# Patient Record
Sex: FEMALE | Race: WHITE | URBAN - METROPOLITAN AREA
[De-identification: names, ages, dates, MRNs, and addresses within clinical notes are randomized per-mention and may not be internally consistent; named-entity substitution may affect disease eponyms.]

---

## 2017-05-08 ENCOUNTER — OFFICE VISIT (OUTPATIENT)
Dept: FAMILY MEDICINE | Facility: CLINIC | Age: 27
End: 2017-05-08
Payer: COMMERCIAL

## 2017-05-08 VITALS
RESPIRATION RATE: 16 BRPM | DIASTOLIC BLOOD PRESSURE: 62 MMHG | BODY MASS INDEX: 20.33 KG/M2 | HEART RATE: 84 BPM | SYSTOLIC BLOOD PRESSURE: 108 MMHG | TEMPERATURE: 98.6 F | OXYGEN SATURATION: 100 % | WEIGHT: 122 LBS | HEIGHT: 65 IN

## 2017-05-08 DIAGNOSIS — M25.512 ACUTE PAIN OF LEFT SHOULDER: Primary | ICD-10-CM

## 2017-05-08 PROCEDURE — 99213 OFFICE O/P EST LOW 20 MIN: CPT | Performed by: NURSE PRACTITIONER
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
## 2017-05-08 NOTE — MR AVS SNAPSHOT
After Visit Summary   5/8/2017    Kady Lambert    MRN: [id number]           Patient Information     Date Of Birth          1990        Visit Information        Provider Department      5/8/2017 1:00 PM Corrina Jordan APRN CNP [facility]        Today's Diagnoses     Acute pain of left shoulder    -  1       Follow-ups after your visit        Additional Services     ORTHOPEDICS ADULT REFERRAL       Your provider has referred you to: [facility]: [facility] [place] -  [facility]  [phone]   [email or web address]  [email or web address]: [facility] [place] [phone]   [email or web address]    [facility] [place] [phone]   [email or web address] [phone]   [email or web address]    Please be aware that coverage of these services is subject to the terms and limitations of your health insurance plan.  Call member services at your health plan with any benefit or coverage questions.      Please bring the following to your appointment:    >>   Any x-rays, CTs or MRIs which have been performed.  Contact the facility where they were done to arrange for  prior to your scheduled appointment.    >>   List of current medications   >>   This referral request   >>   Any documents/labs given to you for this referral                  Who to contact     If you have questions or need follow up information about today's clinic visit or your schedule please contact [facility] directly at [phone].  Normal or non-critical lab and imaging results will be communicated to you by MyChart, letter or phone within 4 business days after the clinic has received the results. If you do not hear from us within 7 days, please contact the clinic through [employer]  "or phone. If you have a critical or abnormal lab result, we will notify you by phone as soon as possible.  Submit refill requests through [employer] or call your pharmacy and they will forward the refill request to us. Please allow 3 business days for your refill to be completed.          Additional Information About Your Visit        Rock-It Cargohart Information     [employer] gives you secure access to your electronic health record. If you see a primary care provider, you can also send messages to your care team and make appointments. If you have questions, please call your primary care clinic.  If you do not have a primary care provider, please call [phone] and they will assist you.        Care EveryWhere ID     This is your Care EveryWhere ID. This could be used by other organizations to access your [facility] medical records  [id number]        Your Vitals Were     Pulse Temperature Respirations Height Pulse Oximetry BMI (Body Mass Index)    84 98.6  F (37  C) (Oral) 16 5' 4.5\" (1.638 m) 100% 20.62 kg/m2       Blood Pressure from Last 3 Encounters:   05/08/17 108/62   04/01/15 90/64   03/28/15 98/60    Weight from Last 3 Encounters:   05/08/17 122 lb (55.3 kg)   04/01/15 130 lb (59 kg)   03/28/15 124 lb (56.2 kg)              We Performed the Following     ORTHOPEDICS ADULT REFERRAL        Primary Care Provider Office Phone # Fax #    AV Vera [facility] [phone]       [facility] [address] [place] [address]        Thank you!     Thank you for choosing [facility]  for your care. Our goal is always to provide you with excellent care. Hearing back from our patients is one way we can continue to improve our services. Please take a few minutes to complete the written survey that you may receive in the mail after your visit with us. Thank you!             Your Updated Medication List - Protect others around you: Learn how to safely use, store and " throw away your medicines at www.disposemymeds.org.          This list is accurate as of: 5/8/17  1:27 PM.  Always use your most recent med list.                   Brand Name Dispense Instructions for use    IBUPROFEN      as needed.       * methylphenidate 20 MG tablet    RITALIN    60 tablet    Take 1 tablet (20 mg) by mouth 2 times daily       * methylphenidate 20 MG tablet    RITALIN    60 tablet    Take 1 tablet (20 mg) by mouth 2 times daily       * methylphenidate 20 MG tablet    RITALIN    60 tablet    Take 1 tablet (20 mg) by mouth 2 times daily       * methylphenidate 20 MG tablet    RITALIN    60 tablet    Take 1 tablet (20 mg) by mouth 2 times daily       * Notice:  This list has 4 medication(s) that are the same as other medications prescribed for you. Read the directions carefully, and ask your doctor or other care provider to review them with you.

## 2017-05-08 NOTE — NURSING NOTE
"Chief Complaint   Patient presents with     Musculoskeletal Problem       Initial /62 (BP Location: Left arm, Patient Position: Chair, Cuff Size: Adult Regular)  Pulse 84  Temp 98.6  F (37  C) (Oral)  Resp 16  Ht 5' 4.5\" (1.638 m)  Wt 122 lb (55.3 kg)  SpO2 100%  BMI 20.62 kg/m2 Estimated body mass index is 20.62 kg/(m^2) as calculated from the following:    Height as of this encounter: 5' 4.5\" (1.638 m).    Weight as of this encounter: 122 lb (55.3 kg).  Medication Reconciliation: complete     Nisa Hayes CMA      "

## 2017-07-01 ENCOUNTER — HEALTH MAINTENANCE LETTER (OUTPATIENT)
Age: 27
End: 2017-07-01

## 2018-05-02 ENCOUNTER — OFFICE VISIT (OUTPATIENT)
Dept: FAMILY MEDICINE | Facility: CLINIC | Age: 28
End: 2018-05-02
Payer: COMMERCIAL

## 2018-05-02 VITALS
SYSTOLIC BLOOD PRESSURE: 134 MMHG | OXYGEN SATURATION: 98 % | BODY MASS INDEX: 20.99 KG/M2 | TEMPERATURE: 98.1 F | DIASTOLIC BLOOD PRESSURE: 76 MMHG | HEIGHT: 65 IN | RESPIRATION RATE: 16 BRPM | HEART RATE: 66 BPM | WEIGHT: 126 LBS

## 2018-05-02 DIAGNOSIS — Z11.3 SCREEN FOR STD (SEXUALLY TRANSMITTED DISEASE): ICD-10-CM

## 2018-05-02 DIAGNOSIS — Z00.00 ROUTINE GENERAL MEDICAL EXAMINATION AT A HEALTH CARE FACILITY: Primary | ICD-10-CM

## 2018-05-02 DIAGNOSIS — Z12.4 SCREENING FOR MALIGNANT NEOPLASM OF CERVIX: ICD-10-CM

## 2018-05-02 PROCEDURE — 36415 COLL VENOUS BLD VENIPUNCTURE: CPT | Performed by: NURSE PRACTITIONER

## 2018-05-02 PROCEDURE — G0145 SCR C/V CYTO,THINLAYER,RESCR: HCPCS | Performed by: NURSE PRACTITIONER

## 2018-05-02 PROCEDURE — 86780 TREPONEMA PALLIDUM: CPT | Performed by: NURSE PRACTITIONER

## 2018-05-02 PROCEDURE — 87491 CHLMYD TRACH DNA AMP PROBE: CPT | Performed by: NURSE PRACTITIONER

## 2018-05-02 PROCEDURE — 86803 HEPATITIS C AB TEST: CPT | Performed by: NURSE PRACTITIONER

## 2018-05-02 PROCEDURE — 99395 PREV VISIT EST AGE 18-39: CPT | Performed by: NURSE PRACTITIONER

## 2018-05-02 PROCEDURE — 87591 N.GONORRHOEAE DNA AMP PROB: CPT | Performed by: NURSE PRACTITIONER

## 2018-05-02 PROCEDURE — 87389 HIV-1 AG W/HIV-1&-2 AB AG IA: CPT | Performed by: NURSE PRACTITIONER

## 2018-05-02 NOTE — NURSING NOTE
"Chief Complaint   Patient presents with     Physical       Initial /76  Pulse 66  Temp 98.1  F (36.7  C) (Oral)  Resp 16  Ht 5' 4.5\" (1.638 m)  Wt 126 lb (57.2 kg)  SpO2 98%  Breastfeeding? No  BMI 21.29 kg/m2 Estimated body mass index is 21.29 kg/(m^2) as calculated from the following:    Height as of this encounter: 5' 4.5\" (1.638 m).    Weight as of this encounter: 126 lb (57.2 kg).  Medication Reconciliation: complete       Lb Riley MA       "

## 2018-05-02 NOTE — PROGRESS NOTES
SUBJECTIVE:   CC: Kady Lambert is an 27 year old woman who presents for preventive health visit.     Physical   Annual:     Getting at least 3 servings of Calcium per day::  Yes    Bi-annual eye exam::  Yes    Dental care twice a year::  Yes    Sleep apnea or symptoms of sleep apnea::  None    Diet::  Regular (no restrictions)    Frequency of exercise::  4-5 days/week    Duration of exercise::  Greater than 60 minutes    Taking medications regularly::  Yes    Medication side effects::  None    Additional concerns today::  No                Today's PHQ-2 Score:   PHQ-2 ( 1999 Pfizer) 5/2/2018   Q1: Little interest or pleasure in doing things 0   Q2: Feeling down, depressed or hopeless 0   PHQ-2 Score 0   Q1: Little interest or pleasure in doing things Not at all   Q2: Feeling down, depressed or hopeless Not at all   PHQ-2 Score 0       Abuse: Current or Past(Physical, Sexual or Emotional)- No  Do you feel safe in your environment - Yes    Social History   Substance Use Topics     Smoking status: Never Smoker     Smokeless tobacco: Never Used     Alcohol use Yes      Comment: occusionally     Alcohol Use 5/2/2018   If you drink alcohol do you typically have greater than 3 drinks per day OR greater than 7 drinks per week? Yes   AUDIT SCORE  6     AUDIT - Alcohol Use Disorders Identification Test - Reproduced from the World Health Organization Audit 2001 (Second Edition) 5/2/2018   1.  How often do you have a drink containing alcohol? 2 to 3 times a week   2.  How many drinks containing alcohol do you have on a typical day when you are drinking? 3 or 4   3.  How often do you have five or more drinks on one occasion? Monthly   4.  How often during the last year have you found that you were not able to stop drinking once you had started? Never   5.  How often during the last year have you failed to do what was normally expected of you because of drinking? Never   6.  How often during the last year have you needed  a first drink in the morning to get yourself going after a heavy drinking session? Never   7.  How often during the last year have you had a feeling of guilt or remorse after drinking? Never   8.  How often during the last year have you been unable to remember what happened the night before because of your drinking? Never   9.  Have you or someone else been injured because of your drinking? No   10. Has a relative, friend, doctor or other health care worker been concerned about your drinking or suggested you cut down? No   TOTAL SCORE 6       Reviewed orders with patient.  Reviewed health maintenance and updated orders accordingly - Yes  Labs reviewed in EPIC  BP Readings from Last 3 Encounters:   05/02/18 134/76   05/08/17 108/62   04/01/15 90/64    Wt Readings from Last 3 Encounters:   05/02/18 126 lb (57.2 kg)   05/08/17 122 lb (55.3 kg)   04/01/15 130 lb (59 kg)                  Patient Active Problem List   Diagnosis     Dysmenorrhea     CARDIOVASCULAR SCREENING; LDL GOAL LESS THAN 160     Wart     Nevus     ADD (attention deficit disorder)     High risk HPV infection, HPV 58, age 19     Past Surgical History:   Procedure Laterality Date     HC REMOVAL OF TONSILS,12+ Y/O  2001    Tonsils 11+y.o.       Social History   Substance Use Topics     Smoking status: Never Smoker     Smokeless tobacco: Never Used     Alcohol use Yes      Comment: occusionally     Family History   Problem Relation Age of Onset     Blood Disease Father      Factor V Leiden     Cardiovascular Paternal Grandfather      Blood Disease Paternal Grandfather      Factor V Leiden     Depression Maternal Grandmother      Depression Maternal Grandfather          Current Outpatient Prescriptions   Medication Sig Dispense Refill     IBUPROFEN as needed.       Allergies   Allergen Reactions     No Known Allergies      Recent Labs   Lab Test  03/28/15   0926  03/18/11   1123   CR   --   0.80   GFRESTIMATED   --   >90   GFRESTBLACK   --   >90  "  POTASSIUM   --   4.2   TSH  1.76   --         Mammogram not appropriate for this patient based on age.    Pertinent mammograms are reviewed under the imaging tab.  History of abnormal Pap smear:   Last 3 Pap and HPV Results:   PAP / HPV 9/23/2013 6/12/2012 4/16/2010   PAP NIL NIL ASC-US(A)       Reviewed and updated as needed this visit by clinical staff  Tobacco  Allergies  Meds  Med Hx  Surg Hx  Fam Hx  Soc Hx        Reviewed and updated as needed this visit by Provider  Tobacco            Review of Systems  CONSTITUTIONAL: NEGATIVE for fever, chills, change in weight  INTEGUMENTARU/SKIN: NEGATIVE for worrisome rashes, moles or lesions  EYES: NEGATIVE for vision changes or irritation  ENT: NEGATIVE for ear, mouth and throat problems  RESP: NEGATIVE for significant cough or SOB  BREAST: NEGATIVE for masses, tenderness or discharge  CV: NEGATIVE for chest pain, palpitations or peripheral edema  GI: NEGATIVE for nausea, abdominal pain, heartburn, or change in bowel habits  : NEGATIVE for unusual urinary or vaginal symptoms. Periods are irregular.  MUSCULOSKELETAL: NEGATIVE for significant arthralgias or myalgia  NEURO: NEGATIVE for weakness, dizziness or paresthesias  PSYCHIATRIC: NEGATIVE for changes in mood or affect     OBJECTIVE:   /76  Pulse 66  Temp 98.1  F (36.7  C) (Oral)  Resp 16  Ht 5' 4.5\" (1.638 m)  Wt 126 lb (57.2 kg)  LMP 04/02/2018 (Approximate)  SpO2 98%  Breastfeeding? No  BMI 21.29 kg/m2  Physical Exam  GENERAL: healthy, alert and no distress  EYES: Eyes grossly normal to inspection, PERRL and conjunctivae and sclerae normal  HENT: ear canals and TM's normal, nose and mouth without ulcers or lesions  NECK: no adenopathy, no asymmetry, masses, or scars and thyroid normal to palpation  RESP: lungs clear to auscultation - no rales, rhonchi or wheezes  BREAST: normal without masses, tenderness or nipple discharge and no palpable axillary masses or adenopathy  CV: regular rate " "and rhythm, normal S1 S2, no S3 or S4, no murmur, click or rub, no peripheral edema and peripheral pulses strong  ABDOMEN: soft, nontender, no hepatosplenomegaly, no masses and bowel sounds normal   (female): normal female external genitalia, normal urethral meatus, vaginal mucosa pink, moist, well rugated, and normal cervix/adnexa/uterus without masses or discharge  MS: no gross musculoskeletal defects noted, no edema  SKIN: no suspicious lesions or rashes  NEURO: Normal strength and tone, mentation intact and speech normal  PSYCH: mentation appears normal, affect normal/bright    ASSESSMENT/PLAN:     (Z00.00) Routine general medical examination at a health care facility  (primary encounter diagnosis)  Comment:   Plan:     (Z12.4) Screening for malignant neoplasm of cervix  Comment:   Plan: Pap imaged thin layer screen reflex to HPV if         ASCUS - recommend age 25 - 29            (Z11.3) Screen for STD (sexually transmitted disease)  Comment:   Plan: Chlamydia trachomatis PCR, Neisseria         gonorrhoeae PCR, Hepatitis C antibody, HIV         Antigen Antibody Combo, Treponema Abs w Reflex         to RPR and Titer            COUNSELING:  Reviewed preventive health counseling, as reflected in patient instructions         reports that she has never smoked. She has never used smokeless tobacco.    Estimated body mass index is 21.29 kg/(m^2) as calculated from the following:    Height as of this encounter: 5' 4.5\" (1.638 m).    Weight as of this encounter: 126 lb (57.2 kg).       Counseling Resources:  ATP IV Guidelines  Pooled Cohorts Equation Calculator  Breast Cancer Risk Calculator  FRAX Risk Assessment  ICSI Preventive Guidelines  Dietary Guidelines for Americans, 2010  USDA's MyPlate  ASA Prophylaxis  Lung CA Screening    AV [facility]  Answers for HPI/ROS submitted by the patient on 5/2/2018   PHQ-2 Score: 0    "

## 2018-05-02 NOTE — MR AVS SNAPSHOT
After Visit Summary   5/2/2018    Kady Lambert    MRN: [id number]           Patient Information     Date Of Birth          1990        Visit Information        Provider Department      5/2/2018 2:40 PM Corrina Jordan APRN [facility]        Today's Diagnoses     Routine general medical examination at a health care facility    -  1    Screening for malignant neoplasm of cervix        Screen for STD (sexually transmitted disease)          Care Instructions      Preventive Health Recommendations  Female Ages 26 - 39  Yearly exam:   See your health care provider every year in order to    Review health changes.     Discuss preventive care.      Review your medicines if you your doctor has prescribed any.    Until age 30: Get a Pap test every three years (more often if you have had an abnormal result).    After age 30: Talk to your doctor about whether you should have a Pap test every 3 years or have a Pap test with HPV screening every 5 years.   You do not need a Pap test if your uterus was removed (hysterectomy) and you have not had cancer.  You should be tested each year for STDs (sexually transmitted diseases), if you're at risk.   Talk to your provider about how often to have your cholesterol checked.  If you are at risk for diabetes, you should have a diabetes test (fasting glucose).  Shots: Get a flu shot each year. Get a tetanus shot every 10 years.   Nutrition:     Eat at least 5 servings of fruits and vegetables each day.    Eat whole-grain bread, whole-wheat pasta and brown rice instead of white grains and rice.    Talk to your provider about Calcium and Vitamin D.     Lifestyle    Exercise at least 150 minutes a week (30 minutes a day, 5 days of the week). This will help you control your weight and prevent disease.    Limit alcohol to one drink per day.    No smoking.     Wear sunscreen to prevent skin cancer.    See your dentist every six months for  "an exam and cleaning.            Follow-ups after your visit        Who to contact     If you have questions or need follow up information about today's clinic visit or your schedule please contact [facility] directly at [phone].  Normal or non-critical lab and imaging results will be communicated to you by NavTechhart, letter or phone within 4 business days after the clinic has received the results. If you do not hear from us within 7 days, please contact the clinic through NavTechhart or phone. If you have a critical or abnormal lab result, we will notify you by phone as soon as possible.  Submit refill requests through [employer] or call your pharmacy and they will forward the refill request to us. Please allow 3 business days for your refill to be completed.          Additional Information About Your Visit        NavTechhart Information     [employer] gives you secure access to your electronic health record. If you see a primary care provider, you can also send messages to your care team and make appointments. If you have questions, please call your primary care clinic.  If you do not have a primary care provider, please call [phone] and they will assist you.        Care EveryWhere ID     This is your Care EveryWhere ID. This could be used by other organizations to access your [facility] medical records  [id number]        Your Vitals Were     Pulse Temperature Respirations Height Last Period Pulse Oximetry    66 98.1  F (36.7  C) (Oral) 16 5' 4.5\" (1.638 m) 04/02/2018 (Approximate) 98%    Breastfeeding? BMI (Body Mass Index)                No 21.29 kg/m2           Blood Pressure from Last 3 Encounters:   05/02/18 134/76   05/08/17 108/62   04/01/15 90/64    Weight from Last 3 Encounters:   05/02/18 126 lb (57.2 kg)   05/08/17 122 lb (55.3 kg)   04/01/15 130 lb (59 kg)              We Performed the Following     Chlamydia trachomatis PCR     Hepatitis C antibody     HIV Antigen Antibody Combo     " Neisseria gonorrhoeae PCR     Pap imaged thin layer screen reflex to HPV if ASCUS - recommend age 25 - 29     Treponema Abs w Reflex to RPR and Titer        Primary Care Provider Office Phone # Fax #    AV Vera [place] [phone] [address] [place] [address]        Equal Access to Services     STEFFANIE TAVERA : Hadii aad ku hadasho Soomaali, waaxda luqadaha, qaybta kaalmada adeegyada, waxay idiin hayaan adeeg kharash la'aan ah. So [facility] [phone].    ATENCIÓN: Si habla español, tiene a siddiqui disposición servicios gratuitos de asistencia lingüística. Gurinder al [phone].    We comply with applicable federal civil rights laws and [place] laws. We do not discriminate on the basis of race, color, national origin, age, disability, sex, sexual orientation, or gender identity.            Thank you!     Thank you for choosing [facility]  for your care. Our goal is always to provide you with excellent care. Hearing back from our patients is one way we can continue to improve our services. Please take a few minutes to complete the written survey that you may receive in the mail after your visit with us. Thank you!             Your Updated Medication List - Protect others around you: Learn how to safely use, store and throw away your medicines at www.disposemymeds.org.          This list is accurate as of 5/2/18  3:20 PM.  Always use your most recent med list.                   Brand Name Dispense Instructions for use Diagnosis    IBUPROFEN      as needed.

## 2018-05-03 LAB
C TRACH DNA SPEC QL NAA+PROBE: NEGATIVE
HCV AB SERPL QL IA: NONREACTIVE
HIV 1+2 AB+HIV1 P24 AG SERPL QL IA: NONREACTIVE
N GONORRHOEA DNA SPEC QL NAA+PROBE: NEGATIVE
SPECIMEN SOURCE: NORMAL
SPECIMEN SOURCE: NORMAL
T PALLIDUM AB SER QL: NONREACTIVE

## 2018-05-03 NOTE — PROGRESS NOTES
Tatiana Hillman,    This note is to let you know that your comprehensive STD (sexually transmitted diseases) panel is negative.   There is no sign of HIV, hepatitis, syphilis, gonorrhea, or chlamydia.     Let me know if you have any questions!    Corrina LEY CNP

## 2018-05-06 LAB
COPATH REPORT: NORMAL
PAP: NORMAL

## 2018-09-10 ENCOUNTER — OFFICE VISIT (OUTPATIENT)
Dept: URGENT CARE | Facility: URGENT CARE | Age: 28
End: 2018-09-10
Payer: COMMERCIAL

## 2018-09-10 ENCOUNTER — RADIANT APPOINTMENT (OUTPATIENT)
Dept: GENERAL RADIOLOGY | Facility: CLINIC | Age: 28
End: 2018-09-10
Attending: PHYSICIAN ASSISTANT
Payer: COMMERCIAL

## 2018-09-10 VITALS
HEART RATE: 80 BPM | WEIGHT: 125 LBS | SYSTOLIC BLOOD PRESSURE: 118 MMHG | HEIGHT: 65 IN | BODY MASS INDEX: 20.83 KG/M2 | TEMPERATURE: 98.3 F | DIASTOLIC BLOOD PRESSURE: 80 MMHG

## 2018-09-10 DIAGNOSIS — S92.341A CLOSED DISPLACED FRACTURE OF FOURTH METATARSAL BONE OF RIGHT FOOT, INITIAL ENCOUNTER: Primary | ICD-10-CM

## 2018-09-10 DIAGNOSIS — S99.921A FOOT INJURY, RIGHT, INITIAL ENCOUNTER: ICD-10-CM

## 2018-09-10 DIAGNOSIS — S92.331A CLOSED DISPLACED FRACTURE OF THIRD METATARSAL BONE OF RIGHT FOOT, INITIAL ENCOUNTER: ICD-10-CM

## 2018-09-10 PROCEDURE — 99213 OFFICE O/P EST LOW 20 MIN: CPT | Performed by: PHYSICIAN ASSISTANT

## 2018-09-10 PROCEDURE — 73630 X-RAY EXAM OF FOOT: CPT | Mod: RT

## 2018-09-10 NOTE — PATIENT INSTRUCTIONS
Your xray shows broken 3rd and 4th metatarsals.  We have placed you in a boot and crutches. You are not to bear any weight on the foot.  May remove the boot for sleeping only.  Use ice and elevation to help decrease pain and swelling.  Take ibuprofen and Tylenol as needed for pain.  Follow up with podiatry in 1 week- referral placed and you will receive a phone call to help you schedule your appointment.    Return to the ER immediately if severe lower leg pain, lack of color or pulses in the foot, or any other severe symptoms.

## 2018-09-10 NOTE — MR AVS SNAPSHOT
After Visit Summary   9/10/2018    Kady Lambert    MRN: [id number]           Patient Information     Date Of Birth          1990        Visit Information        Provider Department      9/10/2018 5:25 PM Baylee Simmons PA-C [facility]        Today's Diagnoses     Closed displaced fracture of fourth metatarsal bone of right foot, initial encounter    -  1    Foot injury, right, initial encounter          Care Instructions    Your xray shows a broken 4th metatarsal.   We have placed you in a boot and crutches. You are not to bear any weight on the foot.  May remove the boot for sleeping only.  Use ice and elevation to help decrease pain and swelling.  Take ibuprofen and Tylenol as needed for pain.  Follow up with podiatry in 1 week- referral placed and you will receive a phone call to help you schedule your appointment.    Return to the ER immediately if severe lower leg pain, lack of color or pulses in the foot, or any other severe symptoms.          Follow-ups after your visit        Additional Services     PODIATRY/FOOT & ANKLE SURGERY REFERRAL       Your provider has referred you to: FMG: [facility] [phone]   [email or web address]  FMG: [facility] [phone]   [email or web address]  FMG: [facility] [phone]   [email or web address]/    Please be aware that coverage of these services is subject to the terms and limitations of your health insurance plan.  Call member services at your health plan with any benefit or coverage questions.      Please bring the following to your appointment:  >>   Any x-rays, CTs or MRIs which have been performed.  Contact the facility where they were done to arrange for  prior to your scheduled appointment.    >>   List of current medications   >>    "This referral request   >>   Any documents/labs given to you for this referral                  Follow-up notes from your care team     Return in about 1 week (around 9/17/2018) for podiatry.      Who to contact     If you have questions or need follow up information about today's clinic visit or your schedule please contact [facility] directly at [phone].  Normal or non-critical lab and imaging results will be communicated to you by SmartGrainshart, letter or phone within 4 business days after the clinic has received the results. If you do not hear from us within 7 days, please contact the clinic through Simplert or phone. If you have a critical or abnormal lab result, we will notify you by phone as soon as possible.  Submit refill requests through Flywheel or call your pharmacy and they will forward the refill request to us. Please allow 3 business days for your refill to be completed.          Additional Information About Your Visit        SmartGrainshart Information     Flywheel gives you secure access to your electronic health record. If you see a primary care provider, you can also send messages to your care team and make appointments. If you have questions, please call your primary care clinic.  If you do not have a primary care provider, please call [phone] and they will assist you.        Care EveryWhere ID     This is your Care EveryWhere ID. This could be used by other organizations to access your [place] medical records  [id number]        Your Vitals Were     Pulse Temperature Height Last Period Breastfeeding? BMI (Body Mass Index)    80 98.3  F (36.8  C) (Oral) 5' 4.5\" (1.638 m) 09/03/2018 No 21.12 kg/m2       Blood Pressure from Last 3 Encounters:   09/10/18 118/80   05/02/18 134/76   05/08/17 108/62    Weight from Last 3 Encounters:   09/10/18 125 lb (56.7 kg)   05/02/18 126 lb (57.2 kg)   05/08/17 122 lb (55.3 kg)              We Performed the Following     PODIATRY/FOOT & ANKLE " SURGERY REFERRAL     XR Foot Right G/E 3 Views        Primary Care Provider Office Phone # Fax #    AV [facility] [phone] [address] [place] [address]        Equal Access to Services     JAQUELIN TAVERA : Hadii aad ku hadasho Soomaali, waaxda luqadaha, qaybta kaalmada adeegyada, waxay idiin hayaan adeeg kharash lacaterina alanis. So [facility] [phone].    ATENCIÓN: Si habla español, tiene a siddiqui disposición servicios gratuitos de asistencia lingüística. Llame al [phone].    We comply with applicable federal civil rights laws and [place] laws. We do not discriminate on the basis of race, color, national origin, age, disability, sex, sexual orientation, or gender identity.            Thank you!     Thank you for choosing [facility]  for your care. Our goal is always to provide you with excellent care. Hearing back from our patients is one way we can continue to improve our services. Please take a few minutes to complete the written survey that you may receive in the mail after your visit with us. Thank you!             Your Updated Medication List - Protect others around you: Learn how to safely use, store and throw away your medicines at www.disposemymeds.org.          This list is accurate as of 9/10/18  6:55 PM.  Always use your most recent med list.                   Brand Name Dispense Instructions for use Diagnosis    IBUPROFEN      as needed.

## 2018-09-10 NOTE — PROGRESS NOTES
"SUBJECTIVE:   Kady Lambert is a 27 year old female presenting for evaluation of   Chief Complaint   Patient presents with     Urgent Care     Musculoskeletal Problem     jumped out of tree today and injured right foot.        MS Injury/Pain    Onset of symptoms was 1 day(s) ago.  Location: right foot  Context:       The injury happened while jumping out of a tree      Mechanism: jumped out of a tree \"over her head\" and landed with her foot flat on the ground      Patient experienced immediate pain, was able to bear weight directly after injury  Course of symptoms is worsening.    Severity moderately severe  Current and Associated symptoms: Pain, Swelling and Bruising  Denies no knee, back, or hip pain. She is not able to bear weight on her right foot now. No head strike.  Aggravating Factors: bearing weight, trying to move the foot  Therapies to improve symptoms include: ice  This is the first time this type of problem has occurred for this patient.   No history of foot injuries or surgeries.    ROS  See HPI    PMH:  Past Medical History:   Diagnosis Date     Allergic rhinitis, cause unspecified      ASCUS with positive high risk HPV 2010    age 19 + HPV 58     Depressive disorder, not elsewhere classified      Dysmenorrhea      Patient Active Problem List    Diagnosis Date Noted     Dysmenorrhea      Priority: High     ADD (attention deficit disorder) 09/23/2013     Priority: Medium     Wart 09/14/2011     Priority: Medium     right index finger       Nevus 09/14/2011     Priority: Medium     mid-low back  IMO update changed this record. Please review for accuracy  Do you wish to do the replacement in the background? yes         CARDIOVASCULAR SCREENING; LDL GOAL LESS THAN 160 10/31/2010     Priority: Medium         Current medications:  Current Outpatient Prescriptions   Medication Sig Dispense Refill     IBUPROFEN as needed.         Surgical History:  Past Surgical History:   Procedure Laterality Date     " "HC REMOVAL OF TONSILS,12+ Y/O  2001    Tonsils 11+y.o.       Family history:  Family History   Problem Relation Age of Onset     Blood Disease Father      Factor V Leiden     Cardiovascular Paternal Grandfather      Blood Disease Paternal Grandfather      Factor V Leiden     Depression Maternal Grandmother      Depression Maternal Grandfather          Social History:  Social History   Substance Use Topics     Smoking status: Never Smoker     Smokeless tobacco: Never Used     Alcohol use Yes      Comment: occusionally         OBJECTIVE  /80  Pulse 80  Temp 98.3  F (36.8  C) (Oral)  Ht 5' 4.5\" (1.638 m)  Wt 125 lb (56.7 kg)  LMP 09/03/2018  Breastfeeding? No  BMI 21.12 kg/m2    Physical Exam    General: well-appearing, no acute distress.   Neck: supple  Lungs:  No distress.  Muscloloskeletal: right lower extremity: DP pulse 2+. No open fractures. Inspection shows edema of right foot laterally. Tenderness over 3rd-4th metatarsal. Ecchymosis in this location as well.  No ankle tenderness. Ankle AROM intact. Toe flexion and extension limited due to pain.   Neuro: Alert. Sensation to light touch intact in distal right lower extremity.  Psych: Normal mood and affect.          Labs:  No results found for this or any previous visit (from the past 24 hour(s)).    X-Ray was done, my findings are: very mildly displaced fracture of distal 3rd metatarsal at head. Displaced fracture of distal 4th metatarsal.        ASSESSMENT/PLAN:      ICD-10-CM    1. Closed displaced fracture of fourth metatarsal bone of right foot, initial encounter S92.341A PODIATRY/FOOT & ANKLE SURGERY REFERRAL   2. Foot injury, right, initial encounter S99.921A XR Foot Right G/E 3 Views   3. Closed displaced fracture of third metatarsal bone of right foot, initial encounter S92.331A         Medical Decision Making:    Serious Comorbid Conditions: none affecting MDM today    Differential Diagnosis:   -foot contusion  -foot fracture    Xrays show " displaced fracture of 3rd and 4th distal metatarsals. Patient is CMS intact and pain is under control.  We will place patient in a CAM boot and crutches - NWB status- and have her follow up with podiatry this week.  Referral placed.  Discussed care for acute fracture including ice, rest, elevation, NSAIDs/Tylenol.    At the end of the encounter, I discussed all available results with patient. Discussed diagnosis and treatment plan.   Return precautions provided to patient and printed below in patient instructions.  Patient understood and agreed to plan. Patient was appropriate for discharge.        Patient Instructions   Your xray shows broken 3rd and 4th metatarsals.  We have placed you in a boot and crutches. You are not to bear any weight on the foot.  May remove the boot for sleeping only.  Use ice and elevation to help decrease pain and swelling.  Take ibuprofen and Tylenol as needed for pain.  Follow up with podiatry in 1 week- referral placed and you will receive a phone call to help you schedule your appointment.    Return to the ER immediately if severe lower leg pain, lack of color or pulses in the foot, or any other severe symptoms.            Baylee Simmons PA-C  09/10/18 8:24 PM

## 2018-09-10 NOTE — LETTER
September 10, 2018      Kady Lambert  [address] [place] [address]        To Whom It May Concern:    Kady Lambert  was seen on 09/10/18.  Please excuse her from work this week, or provide her with sitting-only duties due to injury. Further return to work instructions to be provided by her foot doctor.        Sincerely,        Baylee Simmons PA-C

## 2018-09-12 ENCOUNTER — OFFICE VISIT (OUTPATIENT)
Dept: PODIATRY | Facility: CLINIC | Age: 28
End: 2018-09-12
Payer: COMMERCIAL

## 2018-09-12 VITALS
WEIGHT: 125 LBS | BODY MASS INDEX: 21.34 KG/M2 | HEIGHT: 64 IN | DIASTOLIC BLOOD PRESSURE: 70 MMHG | SYSTOLIC BLOOD PRESSURE: 112 MMHG

## 2018-09-12 DIAGNOSIS — S92.341A CLOSED DISPLACED FRACTURE OF FOURTH METATARSAL BONE OF RIGHT FOOT, INITIAL ENCOUNTER: ICD-10-CM

## 2018-09-12 DIAGNOSIS — S92.334A CLOSED NONDISPLACED FRACTURE OF THIRD METATARSAL BONE OF RIGHT FOOT, INITIAL ENCOUNTER: ICD-10-CM

## 2018-09-12 DIAGNOSIS — M79.671 RIGHT FOOT PAIN: Primary | ICD-10-CM

## 2018-09-12 PROCEDURE — 99243 OFF/OP CNSLTJ NEW/EST LOW 30: CPT | Performed by: PODIATRIST

## 2018-09-12 NOTE — MR AVS SNAPSHOT
After Visit Summary   9/12/2018    Kady Lambert    MRN: [id number]           Patient Information     Date Of Birth          1990        Visit Information        Provider Department      9/12/2018 11:15 AM Emy Croft DPM, Podiatry/Foot and Ankle Surgery [facility]        Care Instructions    1 month follow up for repeat xrays.     Thank you for choosing [facility] / [facility]le [facility]!    DR. CROFT'S CLINIC SCHEDULE  MONDAY AM - HAND TUESDAY - [place]   [address] [address] [place] [address] [place] [address]   [phone] / [phone] / [phone]       WEDNESDAY - [place] FRIDAY AM - [facility]   [address]   [place] [address] [place] [address]   [phone] / [phone]       FRIDAY PM - [place] SCHEDULE SURGERY: [phone]   [address] BILLING QUESTIONS: [phone]   [place] [address] AFTER HOURS: [phone]   [phone] / [phone] APPOINTMENTS: [phone]     Consumer Price Line (CPL) [phone]       TOE & METATARSAL FRACTURES  The structure of the foot is complex, consisting of bones, muscles, tendons, and other soft tissues. Of the 26 bones in the foot, 19 are toe bones (phalanges) and metatarsal bones (the long bones in the midfoot). Fractures of the toe and metatarsal bones are common and require evaluation by a specialist. A foot and ankle surgeon should be seen for proper diagnosis and treatment, even if initial treatment has been received in an emergency room.  A fracture is a break in the bone. Fractures can be divided into two categories: traumatic fractures and stress fractures.  TRAUMATIC FRACTURES (also called acute fractures) are caused by a direct blow or impact, such as seriously stubbing your toe. Traumatic fractures can be displaced or non-displaced. If the fracture is displaced, the bone is  broken in such a way that it has changed in position (dislocated).  Signs and symptoms of a traumatic fracture include:  You may hear a sound at the time of the break.    Pinpoint pain  (pain at the place of impact) at the time the fracture occurs and perhaps for a few hours later, but often the pain goes away after several hours.   Crooked or abnormal appearance of the toe.   Bruising and swelling the next day.   It is not true that  if you can walk on it, it s not broken.  Evaluation by a foot and ankle surgeon is always recommended.   STRESS FRACTURES are tiny, hairline breaks that are usually caused by repetitive stress. Stress fractures often afflict athletes who, for example, too rapidly increase their running mileage. They can also be caused by an abnormal foot structure, deformities, or osteoporosis. Improper footwear may also lead to stress fractures. Stress fractures should not be ignored. They require proper medical attention to heal correctly.  Symptoms of stress fractures include:  Pain with or after normal activity   Pain that goes away when resting and then returns when standing or during activity    Pinpoint pain  (pain at the site of the fracture) when touched   Swelling, but no bruising   IMPROPER TREATMENT  Some people say that  the doctor can t do anything for a broken bone in the foot.  This is usually not true. In fact, if a fractured toe or metatarsal bone is not treated correctly, serious complications may develop. For example:  A deformity in the bony architecture which may limit the ability to move the foot or cause difficulty in fitting shoes   Arthritis, which may be caused by a fracture in a joint (the juncture where two bones meet), or may be a result of angular deformities that develop when a displaced fracture is severe or hasn t been properly corrected   Chronic pain and deformity   Non-union, or failure to heal, can lead to subsequent surgery or chronic pain.   PROPER TREATMENT FOR  TOES  Fractures of the toe bones are almost always traumatic fractures. Treatment for traumatic fractures depends on the break itself and may include these options:  Rest. Sometimes rest is all that is needed to treat a traumatic fracture of the toe.   Splinting. The toe may be fitted with a splint to keep it in a fixed position.   Rigid or stiff-soled shoe. Wearing a stiff-soled shoe protects the toe and helps keep it properly positioned.    Raffy taping  the fractured toe to another toe is sometimes appropriate, but in other cases it may be harmful.   Surgery. If the break is badly displaced or if the joint is affected, surgery may be necessary. Surgery often involves the use of fixation devices, such as pins.   PROPER TREATMENT OF METATARSALS  Breaks in the metatarsal bones may be either stress or traumatic fractures. Certain kinds of fractures of the metatarsal bones present unique challenges.  For example, sometimes a fracture of the first metatarsal bone (behind the big toe) can lead to arthritis. Since the big toe is used so frequently and bears more weight than other toes, arthritis in that area can make it painful to walk, bend, or even stand.  Another type of break, called a Stokes fracture, occurs at the base of the fifth metatarsal bone (behind the little toe). It is often misdiagnosed as an ankle sprain, and misdiagnosis can have serious consequences since sprains and fractures require different treatments. Your foot and ankle surgeon is an expert in correctly identifying these conditions as well as other problems of the foot.  Treatment of metatarsal fractures depends on the type and extent of the fracture, and may include:  Rest. Sometimes rest is the only treatment needed to promote healing of a stress or traumatic fracture of a metatarsal bone.   Avoid the offending activity. Because stress fractures result from repetitive stress, it is important to avoid the activity that led to the fracture.  Crutches or a wheelchair are sometimes required to offload weight from the foot to give it time to heal.   Immobilization, casting, or rigid shoe. A stiff-soled shoe or other form of immobilization may be used to protect the fractured bone while it is healing.   Surgery. Some traumatic fractures of the metatarsal bones require surgery, especially if the break is badly displaced.   Follow-up care. Your foot and ankle surgeon will provide instructions for care following surgical or non-surgical treatment. Physical therapy, exercises and rehabilitation may be included in a schedule for return to normal activities.     CALCIUM & VIAMIN D SUPPLEMENTS  Calcium and Vitamin D are important elements that help you maintain healthy bones. Vitamin D is created in your skin and is obtained through your diet. Skin production of Vitamin D declines with age and many diets are deficient in Vitamin D. Our skin produces very little Vitamin D during the winter time because we do not get sun exposure. This is problematic for people who live in [place]. If you do not take supplements, you are probably deficient. Vitamin D deficiency is associated with osteoporosis, increased risk offalls and fractures. Post menopausal women are especially prone to osteoporosis where fractures are common. Vitamin D also affects the immune and cardiovascular systems.   Vitamin D is also responsible for the intestinal absorption of calcium. Vitamin D and calcium work together to maintain strong and healthy bones. Milk is generally fortified with Vitamin D and calcium. Other dairy products are not fortified and therefore contain lower levels of both substances. People who eat dairy products but avoid milk are likely deficient in both Vitamin D and calcium.    The current recommendation is that all adults should consume at least 800 international units (IU) of Vitamin D per day. Lower levels of Vitamin D are not as effective while doses higher than 2000 IUs  per day for extended periods can be toxic. Calcium intake should be 6090-0986 mg per day.   Calculation of your daily intake should include dietary sources and vitamins. Vitamin D tablets containing 1000 IUs are a good dose for daily supplementation. Your diet likely provides minimal Vitamin D. Calcium supplements oftentimes contain small amounts of VitaminD.         Body Mass Index (BMI)  Many things can cause foot and ankle problems. Foot structure, activity level, foot mechanics and injuries are common causes of pain.  One very important issue that often goes unmentioned, is body weight. Extra weight can cause increased stress on muscles, ligaments, bones and tendons.  Sometimes just a few extra pounds is all it takes to put one over her/his threshold. Without reducing that stress, it can be difficult to alleviate pain. Some people are uncomfortable addressing this issue, but we feel it is important for you to think about it. As Foot &  Ankle specialists, our job is addressing the lower extremity problem and possible causes. Regarding extra body weight, we encourage patients to discuss diet and weight management plans with their primary care doctors. It is this team approach that gives you the best opportunity for pain relief and getting you back on your feet.                  Follow-ups after your visit        Who to contact     If you have questions or need follow up information about today's clinic visit or your schedule please contact [facility] directly at [phone].  Normal or non-critical lab and imaging results will be communicated to you by MyChart, letter or phone within 4 business days after the clinic has received the results. If you do not hear from us within 7 days, please contact the clinic through MyChart or phone. If you have a critical or abnormal lab result, we will notify you by phone as soon as possible.  Submit refill requests through Algolia or call your pharmacy and they  "will forward the refill request to us. Please allow 3 business days for your refill to be completed.          Additional Information About Your Visit        Cloud Your Carhart Information     Gertrude gives you secure access to your electronic health record. If you see a primary care provider, you can also send messages to your care team and make appointments. If you have questions, please call your primary care clinic.  If you do not have a primary care provider, please call [phone] and they will assist you.        Care EveryWhere ID     This is your Care EveryWhere ID. This could be used by other organizations to access your [facility] medical records  [id number]        Your Vitals Were     Height Last Period BMI (Body Mass Index)             5' 4\" (1.626 m) 09/03/2018 21.46 kg/m2          Blood Pressure from Last 3 Encounters:   09/12/18 112/70   09/10/18 118/80   05/02/18 134/76    Weight from Last 3 Encounters:   09/12/18 125 lb (56.7 kg)   09/10/18 125 lb (56.7 kg)   05/02/18 126 lb (57.2 kg)              Today, you had the following     No orders found for display       Primary Care Provider Office Phone # Fax #    AV [facility] [phone] [address] [place] [address]        Equal Access to Services     JAQUELIN TAVERA : Hadii aad ku hadasho Soomaali, waaxda luqadaha, qaybta kaalmada adeegyada, alma alanis. So [facility] [phone].    ATENCIÓN: Si habla español, tiene a siddiqui disposición servicios gratuitos de asistencia lingüística. Gurinder al [phone].    We comply with applicable federal civil rights laws and [place] laws. We do not discriminate on the basis of race, color, national origin, age, disability, sex, sexual orientation, or gender identity.            Thank you!     Thank you for choosing [facility]  for your care. Our goal is always to provide you with excellent care. Hearing back from our patients is one " way we can continue to improve our services. Please take a few minutes to complete the written survey that you may receive in the mail after your visit with us. Thank you!             Your Updated Medication List - Protect others around you: Learn how to safely use, store and throw away your medicines at www.disposemymeds.org.          This list is accurate as of 9/12/18 11:35 AM.  Always use your most recent med list.                   Brand Name Dispense Instructions for use Diagnosis    IBUPROFEN      as needed.

## 2018-09-12 NOTE — LETTER
9/12/2018         RE: Kady Lambert  [address]  [place] [address]        Dear Colleague,    Thank you for referring your patient, Kady Lambert, to the [facility]. Please see a copy of my visit note below.       PATIENT HISTORY:  Dr. Simmons requested I see this patient for their foot issue.  Kady Lambert is a 27 year old female who presents to clinic for right foot fracture. Notes it happened 2 days ago. Jumped out of a tree. Was very painful. Seen in clinic and put in a boot and was told it was fractured. Pain is 4/10 at its worst. Wondering if she needs surgery.     Review of Systems:  Patient denies fever, chills, rash, wound, stiffness, limping, numbness, weakness, heart burn, blood in stool, chest pain with activity, calf pain when walking, shortness of breath with activity, chronic cough, easy bleeding/bruising, swelling of ankles, excessive thirst, fatigue, depression, anxiety.      PAST MEDICAL HISTORY:   Past Medical History:   Diagnosis Date     Allergic rhinitis, cause unspecified      ASCUS with positive high risk HPV 2010    age 19 + HPV 58     Depressive disorder, not elsewhere classified      Dysmenorrhea         PAST SURGICAL HISTORY:   Past Surgical History:   Procedure Laterality Date     HC REMOVAL OF TONSILS,11+ Y/O  2001    Tonsils 11+y.o.        MEDICATIONS:   Current Outpatient Prescriptions:      IBUPROFEN, as needed., Disp: , Rfl:      ALLERGIES:    Allergies   Allergen Reactions     No Known Allergies         SOCIAL HISTORY:   Social History     Social History     Marital status: Single     Spouse name: N/A     Number of children: 0     Years of education: N/A     Occupational History      Student     /[employer]     Social History Main Topics     Smoking status: Never Smoker     Smokeless tobacco: Never Used     Alcohol use Yes      Comment: occusionally     Drug use: No     Sexual activity: Yes     Partners: Male     " Birth control/ protection: Injection     Other Topics Concern     Parent/Sibling W/ Cabg, Mi Or Angioplasty Before 65f 55m? No     Social History Narrative    Dairy/d 3-4 servings/d.     Caffeine 1 servings/d    Exercise 7 x week    Sunscreen used - NO    Seatbelts used - YES    Working smoke/CO detectors in the home - YES    Guns stored in the home - NO    Self Breast Exams - NO    Self Testicular Exam - NOT APPLICABLE    Eye Exam up to date - YES    Dental Exam up to date - YES    Pap Smear up to date - 1st pap today    Mammogram up to date - NOT APPLICABLE    PSA up to date - NOT APPLICABLE    Dexa Scan up to date - NOT APPLICABLE    Flex Sig / Colonoscopy up to date - NOT APPLICABLE    Immunizations up to date - YES    Abuse: Current or Past(Physical, Sexual or Emotional)- NO    Do you feel safe in your environment - YES        4/16/2010 .Shannan Ortiz, NA/R                    FAMILY HISTORY:   Family History   Problem Relation Age of Onset     Blood Disease Father      Factor V Leiden     Cardiovascular Paternal Grandfather      Blood Disease Paternal Grandfather      Factor V Leiden     Depression Maternal Grandmother      Depression Maternal Grandfather         EXAM:Vitals: /70  Ht 5' 4\" (1.626 m)  Wt 125 lb (56.7 kg)  LMP 09/03/2018  BMI 21.46 kg/m2    General appearance: Patient is alert and fully cooperative with history & exam.  No sign of distress is noted during the visit.     Psychiatric: Affect is pleasant & appropriate.  Patient appears motivated to improve health.     Respiratory: Breathing is regular & unlabored while sitting.     HEENT: Hearing is intact to spoken word.  Speech is clear.  No gross evidence of visual impairment that would impact ambulation.     Dermatologic: Skin is intact to both lower extremities without significant lesions, rash or abrasion.  No paronychia or evidence of soft tissue infection is noted.     Vascular: DP & PT pulses are intact & regular bilaterally.  No " significant edema or varicosities noted.  CFT and skin temperature is normal to both lower extremities.     Neurologic: Lower extremity sensation is intact to light touch.  No evidence of weakness or contracture in the lower extremities.  No evidence of neuropathy.     Musculoskeletal: Patient is ambulatory without assistive device or brace.  Pain on palpation of right 3rd and 4th metatarsal head.     Radiographs:  Right foot: Minimally displaced third and fourth metatarsal neck  fractures. Osseous structures otherwise appear intact.     ASSESSMENT:     Right foot pain  Closed displaced fracture of fourth metatarsal bone of right foot, initial encounter  Closed nondisplaced fracture of third metatarsal bone of right foot, initial encounter       PLAN:  Reviewed patient's chart in epic. Reviewed xrays. Talked about fractures. Discussed that healing can take 6-10 weeks. Risk that the fracture will not heal and we may need to do surgery. Risk is increased 10-15% if you smoke.     After reviewing xrays, I would hold off on surgery. Discussed that she should be in the boot for next 6 weeks. Follow up in 1 month for repeat xrays. If further displacement or non union occurs, may need surgery later. She is a smoker and discussed delayed healing.          Emy Croft DPM, Podiatry/Foot and Ankle Surgery          Again, thank you for allowing me to participate in the care of your patient.        Sincerely,        Emy Croft DPM, Podiatry/Foot and Ankle Surgery

## 2018-09-12 NOTE — PROGRESS NOTES
PATIENT HISTORY:  Dr. Simmons requested I see this patient for their foot issue.  Kady Lambert is a 27 year old female who presents to clinic for right foot fracture. Notes it happened 2 days ago. Jumped out of a tree. Was very painful. Seen in clinic and put in a boot and was told it was fractured. Pain is 4/10 at its worst. Wondering if she needs surgery.     Review of Systems:  Patient denies fever, chills, rash, wound, stiffness, limping, numbness, weakness, heart burn, blood in stool, chest pain with activity, calf pain when walking, shortness of breath with activity, chronic cough, easy bleeding/bruising, swelling of ankles, excessive thirst, fatigue, depression, anxiety.      PAST MEDICAL HISTORY:   Past Medical History:   Diagnosis Date     Allergic rhinitis, cause unspecified      ASCUS with positive high risk HPV 2010    age 19 + HPV 57     Depressive disorder, not elsewhere classified      Dysmenorrhea         PAST SURGICAL HISTORY:   Past Surgical History:   Procedure Laterality Date     HC REMOVAL OF TONSILS,11+ Y/O  2001    Tonsils 11+y.o.        MEDICATIONS:   Current Outpatient Prescriptions:      IBUPROFEN, as needed., Disp: , Rfl:      ALLERGIES:    Allergies   Allergen Reactions     No Known Allergies         SOCIAL HISTORY:   Social History     Social History     Marital status: Single     Spouse name: N/A     Number of children: 0     Years of education: N/A     Occupational History      Student     /[employer]     Social History Main Topics     Smoking status: Never Smoker     Smokeless tobacco: Never Used     Alcohol use Yes      Comment: occusionally     Drug use: No     Sexual activity: Yes     Partners: Male     Birth control/ protection: Injection     Other Topics Concern     Parent/Sibling W/ Cabg, Mi Or Angioplasty Before 65f 55m? No     Social History Narrative    Dairy/d 3-4 servings/d.     Caffeine 1 servings/d    Exercise 7 x week    Sunscreen used -  "NO    Seatbelts used - YES    Working smoke/CO detectors in the home - YES    Guns stored in the home - NO    Self Breast Exams - NO    Self Testicular Exam - NOT APPLICABLE    Eye Exam up to date - YES    Dental Exam up to date - YES    Pap Smear up to date - 1st pap today    Mammogram up to date - NOT APPLICABLE    PSA up to date - NOT APPLICABLE    Dexa Scan up to date - NOT APPLICABLE    Flex Sig / Colonoscopy up to date - NOT APPLICABLE    Immunizations up to date - YES    Abuse: Current or Past(Physical, Sexual or Emotional)- NO    Do you feel safe in your environment - YES        4/16/2010 .Shannan Ortiz, NA/R                    FAMILY HISTORY:   Family History   Problem Relation Age of Onset     Blood Disease Father      Factor V Leiden     Cardiovascular Paternal Grandfather      Blood Disease Paternal Grandfather      Factor V Leiden     Depression Maternal Grandmother      Depression Maternal Grandfather         EXAM:Vitals: /70  Ht 5' 4\" (1.626 m)  Wt 125 lb (56.7 kg)  LMP 09/03/2018  BMI 21.46 kg/m2    General appearance: Patient is alert and fully cooperative with history & exam.  No sign of distress is noted during the visit.     Psychiatric: Affect is pleasant & appropriate.  Patient appears motivated to improve health.     Respiratory: Breathing is regular & unlabored while sitting.     HEENT: Hearing is intact to spoken word.  Speech is clear.  No gross evidence of visual impairment that would impact ambulation.     Dermatologic: Skin is intact to both lower extremities without significant lesions, rash or abrasion.  No paronychia or evidence of soft tissue infection is noted.     Vascular: DP & PT pulses are intact & regular bilaterally.  No significant edema or varicosities noted.  CFT and skin temperature is normal to both lower extremities.     Neurologic: Lower extremity sensation is intact to light touch.  No evidence of weakness or contracture in the lower extremities.  No evidence " of neuropathy.     Musculoskeletal: Patient is ambulatory without assistive device or brace.  Pain on palpation of right 3rd and 4th metatarsal head.     Radiographs:  Right foot: Minimally displaced third and fourth metatarsal neck  fractures. Osseous structures otherwise appear intact.     ASSESSMENT:     Right foot pain  Closed displaced fracture of fourth metatarsal bone of right foot, initial encounter  Closed nondisplaced fracture of third metatarsal bone of right foot, initial encounter       PLAN:  Reviewed patient's chart in epic. Reviewed xrays. Talked about fractures. Discussed that healing can take 6-10 weeks. Risk that the fracture will not heal and we may need to do surgery. Risk is increased 10-15% if you smoke.     After reviewing xrays, I would hold off on surgery. Discussed that she should be in the boot for next 6 weeks. Follow up in 1 month for repeat xrays. If further displacement or non union occurs, may need surgery later. She is a smoker and discussed delayed healing.          Emy Croft DPM, Podiatry/Foot and Ankle Surgery

## 2018-09-12 NOTE — PATIENT INSTRUCTIONS
1 month follow up for repeat xrays.     Thank you for choosing [facility]!    DR. SNOW'S CLINIC SCHEDULE  MONDAY AM - HAND TUESDAY - [place]   [address] [address] [place] [address] [place] [address]   [phone] / [phone] / [phone]       WEDNESDAY - [place] FRIDAY AM - [facility]   [address]   [place] [address] [place] [address]   [phone] / [phone]       FRIDAY PM - [place] SCHEDULE SURGERY: [phone]   [address] BILLING QUESTIONS: [phone]   [place] [address] AFTER HOURS: [phone]   [phone] / [phone] APPOINTMENTS: [phone]     Consumer Price Line (CPL) [phone]       TOE & METATARSAL FRACTURES  The structure of the foot is complex, consisting of bones, muscles, tendons, and other soft tissues. Of the 26 bones in the foot, 19 are toe bones (phalanges) and metatarsal bones (the long bones in the midfoot). Fractures of the toe and metatarsal bones are common and require evaluation by a specialist. A foot and ankle surgeon should be seen for proper diagnosis and treatment, even if initial treatment has been received in an emergency room.  A fracture is a break in the bone. Fractures can be divided into two categories: traumatic fractures and stress fractures.  TRAUMATIC FRACTURES (also called acute fractures) are caused by a direct blow or impact, such as seriously stubbing your toe. Traumatic fractures can be displaced or non-displaced. If the fracture is displaced, the bone is broken in such a way that it has changed in position (dislocated).  Signs and symptoms of a traumatic fracture include:  You may hear a sound at the time of the break.    Pinpoint pain  (pain at the place of impact) at the time the fracture occurs and perhaps for a few hours later, but often the pain goes away after several hours.   Crooked  or abnormal appearance of the toe.   Bruising and swelling the next day.   It is not true that  if you can walk on it, it s not broken.  Evaluation by a foot and ankle surgeon is always recommended.   STRESS FRACTURES are tiny, hairline breaks that are usually caused by repetitive stress. Stress fractures often afflict athletes who, for example, too rapidly increase their running mileage. They can also be caused by an abnormal foot structure, deformities, or osteoporosis. Improper footwear may also lead to stress fractures. Stress fractures should not be ignored. They require proper medical attention to heal correctly.  Symptoms of stress fractures include:  Pain with or after normal activity   Pain that goes away when resting and then returns when standing or during activity    Pinpoint pain  (pain at the site of the fracture) when touched   Swelling, but no bruising   IMPROPER TREATMENT  Some people say that  the doctor can t do anything for a broken bone in the foot.  This is usually not true. In fact, if a fractured toe or metatarsal bone is not treated correctly, serious complications may develop. For example:  A deformity in the bony architecture which may limit the ability to move the foot or cause difficulty in fitting shoes   Arthritis, which may be caused by a fracture in a joint (the juncture where two bones meet), or may be a result of angular deformities that develop when a displaced fracture is severe or hasn t been properly corrected   Chronic pain and deformity   Non-union, or failure to heal, can lead to subsequent surgery or chronic pain.   PROPER TREATMENT FOR TOES  Fractures of the toe bones are almost always traumatic fractures. Treatment for traumatic fractures depends on the break itself and may include these options:  Rest. Sometimes rest is all that is needed to treat a traumatic fracture of the toe.   Splinting. The toe may be fitted with a splint to keep it in a fixed position.   Rigid or  stiff-soled shoe. Wearing a stiff-soled shoe protects the toe and helps keep it properly positioned.    Raffy taping  the fractured toe to another toe is sometimes appropriate, but in other cases it may be harmful.   Surgery. If the break is badly displaced or if the joint is affected, surgery may be necessary. Surgery often involves the use of fixation devices, such as pins.   PROPER TREATMENT OF METATARSALS  Breaks in the metatarsal bones may be either stress or traumatic fractures. Certain kinds of fractures of the metatarsal bones present unique challenges.  For example, sometimes a fracture of the first metatarsal bone (behind the big toe) can lead to arthritis. Since the big toe is used so frequently and bears more weight than other toes, arthritis in that area can make it painful to walk, bend, or even stand.  Another type of break, called a Stokes fracture, occurs at the base of the fifth metatarsal bone (behind the little toe). It is often misdiagnosed as an ankle sprain, and misdiagnosis can have serious consequences since sprains and fractures require different treatments. Your foot and ankle surgeon is an expert in correctly identifying these conditions as well as other problems of the foot.  Treatment of metatarsal fractures depends on the type and extent of the fracture, and may include:  Rest. Sometimes rest is the only treatment needed to promote healing of a stress or traumatic fracture of a metatarsal bone.   Avoid the offending activity. Because stress fractures result from repetitive stress, it is important to avoid the activity that led to the fracture. Crutches or a wheelchair are sometimes required to offload weight from the foot to give it time to heal.   Immobilization, casting, or rigid shoe. A stiff-soled shoe or other form of immobilization may be used to protect the fractured bone while it is healing.   Surgery. Some traumatic fractures of the metatarsal bones require surgery, especially  if the break is badly displaced.   Follow-up care. Your foot and ankle surgeon will provide instructions for care following surgical or non-surgical treatment. Physical therapy, exercises and rehabilitation may be included in a schedule for return to normal activities.     CALCIUM & VIAMIN D SUPPLEMENTS  Calcium and Vitamin D are important elements that help you maintain healthy bones. Vitamin D is created in your skin and is obtained through your diet. Skin production of Vitamin D declines with age and many diets are deficient in Vitamin D. Our skin produces very little Vitamin D during the winter time because we do not get sun exposure. This is problematic for people who live in [place]. If you do not take supplements, you are probably deficient. Vitamin D deficiency is associated with osteoporosis, increased risk offalls and fractures. Post menopausal women are especially prone to osteoporosis where fractures are common. Vitamin D also affects the immune and cardiovascular systems.   Vitamin D is also responsible for the intestinal absorption of calcium. Vitamin D and calcium work together to maintain strong and healthy bones. Milk is generally fortified with Vitamin D and calcium. Other dairy products are not fortified and therefore contain lower levels of both substances. People who eat dairy products but avoid milk are likely deficient in both Vitamin D and calcium.    The current recommendation is that all adults should consume at least 800 international units (IU) of Vitamin D per day. Lower levels of Vitamin D are not as effective while doses higher than 2000 IUs per day for extended periods can be toxic. Calcium intake should be 2742-8464 mg per day.   Calculation of your daily intake should include dietary sources and vitamins. Vitamin D tablets containing 1000 IUs are a good dose for daily supplementation. Your diet likely provides minimal Vitamin D. Calcium supplements oftentimes contain small amounts  of VitaminD.     SMOKING CESSATION  What's in cigarette smoke? - Cigarette smoke contains over 4,000 chemicals. Nicotine is one of the main ingredients which is an insecticide/herbicide. It is poisonous to our nervous system, increases blood clotting risk, and decreases the body's defenses to fight off infection. Another chemical is Carbon Monoxide is an asphyxiating gas that permanently binds to blood cells and blocks the transport of oxygen. This leads to tissue death and decreases your metabolism. Tar is a chemical that coats your lungs and trachea which impairs new oxygen coming in and carbon dioxide getting out of your body.   How does smoking impact surgery? - Smoking is particularly hazardous with regards to surgery. Surgery puts stress on the body and a smoker's body is already under strain from these chemicals. Putting the two together, especially for an elective surgery, could be a recipe for disaster. Smoking before and after surgery increases your risk of heart problems, slow wound healing, delayed bone healing, blood clots, wound infection and anesthesia complications.   What are the benefits of quitting? - In 20 minutes your blood pressure will drop back down to normal. In 8 hours the carbon monoxide (a toxic gas) levels in your blood stream will drop by half, and oxygen levels will return to normal. In 48 hours your chance of having a heart attack will have decreased. All nicotine will have left your body. Your sense of taste and smell will return to a normal level. In 72 hours your bronchial tubes will relax, and your energy levels will increase. In 2 weeks your circulation will increase, and it will continue to improve for the next 10 weeks.    Recommendations for elective surgery - Ideally, patients should quit smoking 8 weeks before and at least 2 weeks after elective surgery in order to avoid complications. Simply cutting back on the amount of cigarettes smoked per day does not offer any benefit  or decrease the risk of poor wound healing, heart problems, and infection. Smokers should also start taking Vitamin C and B for two weeks before surgery and two weeks after surgery.    Ways to Stop Smokin. Nicotine patches, lozenges, or gum  2. Support Groups  3. Medications (see below)    List of Medications:  1. Varenicline Tartrate (CHANTIX)   2. Bupropion HCL (WELLBUTRIN, ZYBAN) - note: make sure Wellbutrin is for smoking cessation and not other issues   3. Nicotine Patch (NICODERM)   4. Nicotine Inhaler (NICOTROL)   5. Nicotine Gum Nicotine Polacrilex   6. Nicotine Lozenge: Nicotine Polacrilex (COMMIT)   * [facility] offers a smoking support group as well!  Please visit: [email or web address]  If you are interested in these, ask about getting a prescription or talk to your primary care doctor about what may be the best way for you to quit.

## 2020-02-08 ENCOUNTER — HEALTH MAINTENANCE LETTER (OUTPATIENT)
Age: 30
End: 2020-02-08

## 2020-11-08 ENCOUNTER — HEALTH MAINTENANCE LETTER (OUTPATIENT)
Age: 30
End: 2020-11-08

## 2021-03-28 ENCOUNTER — HEALTH MAINTENANCE LETTER (OUTPATIENT)
Age: 31
End: 2021-03-28

## 2021-09-11 ENCOUNTER — HEALTH MAINTENANCE LETTER (OUTPATIENT)
Age: 31
End: 2021-09-11

## 2022-04-23 ENCOUNTER — HEALTH MAINTENANCE LETTER (OUTPATIENT)
Age: 32
End: 2022-04-23

## 2022-10-30 ENCOUNTER — HEALTH MAINTENANCE LETTER (OUTPATIENT)
Age: 32
End: 2022-10-30

## 2023-06-01 ENCOUNTER — HEALTH MAINTENANCE LETTER (OUTPATIENT)
Age: 33
End: 2023-06-01